# Patient Record
(demographics unavailable — no encounter records)

---

## 2024-12-03 NOTE — PLAN
[FreeTextEntry5] : Assessment: Patient is a 56 yo female with h/o bipolar disorder, anxiety seen today for medication management. Patient is compliant with the medications, tolerating it well without any side effects. I-STOP was checked without any problems.  PLAN: Continue Depakote 750 mg PO QD for bipolar disorder Continue Zyprexa 10 mg PO QHS for mood disorder Continue Cymbalta 60 mg PO QHD for anxiety - Discussed risks and benefits of medications including side effects of GI and sexual with SSRI. Alternative strategies including no intervention discussed with patient. Patient consents to current medications as prescribed. - Discussed with patient regarding importance of abstinence and sobriety from alcohol and drugs. Educated about relationship between worsening mood/anxiety symptoms and drug use and improvement of symptoms with abstinence.  - Discussed about unpredictable effects including cardiorespiratory collapse from the combination of illicit drugs and prescribed medications. Patient verbalized understanding. - Patient understands to contact clinic prn with concerns and agrees to call 911 or go to nearest ER if symptoms worsen. - Next appointment was made by the patient in 6 months Patient was not in any distress.

## 2024-12-03 NOTE — HISTORY OF PRESENT ILLNESS
[FreeTextEntry1] :  Patient is here in the office for face to face interview with writer for 3 month follow-up visit.  No medication changes at that time.   Mood: states she is doing well. Feels stable.  Sleepin hours per night.  Appetite is good. Energy, concentration and motivation are all better.  Denies any AVH, SI or HI.  Denies any substance or alcohol use disorder.

## 2025-06-03 NOTE — PLAN
[FreeTextEntry5] : Assessment: Patient is a 58 yo female with h/o bipolar disorder, anxiety seen today for medication management. Patient is compliant with the medications, tolerating it well without any side effects. I-STOP was checked without any problems.  PLAN: Continue Depakote 750 mg PO QD for bipolar disorder Continue Zyprexa 10 mg PO QHS for mood disorder Continue Cymbalta 60 mg PO QHD for anxiety - Discussed risks and benefits of medications including side effects of GI and sexual with SSRI. Alternative strategies including no intervention discussed with patient. Patient consents to current medications as prescribed. - Discussed with patient regarding importance of abstinence and sobriety from alcohol and drugs. Educated about relationship between worsening mood/anxiety symptoms and drug use and improvement of symptoms with abstinence.  - Discussed about unpredictable effects including cardiorespiratory collapse from the combination of illicit drugs and prescribed medications. Patient verbalized understanding. - Patient understands to contact clinic prn with concerns and agrees to call 911 or go to nearest ER if symptoms worsen. - Next appointment was made by the patient in 6 months Patient was not in any distress.

## 2025-06-03 NOTE — HISTORY OF PRESENT ILLNESS
[FreeTextEntry1] : Patient is here in the office for face to face interview with writer for 6 month follow-up visit.  No medication changes at that time. States she is very stable on the current meds.   Mood: states she is doing well. Feels stable.  Sleepin hours per night.  Appetite is good. Energy, concentration and motivation are all better.  Denies any AVH, SI or HI.  Denies any substance or alcohol use disorder.